# Patient Record
Sex: FEMALE | Race: WHITE | Employment: FULL TIME | URBAN - METROPOLITAN AREA
[De-identification: names, ages, dates, MRNs, and addresses within clinical notes are randomized per-mention and may not be internally consistent; named-entity substitution may affect disease eponyms.]

---

## 2017-06-21 ENCOUNTER — OFFICE VISIT (OUTPATIENT)
Dept: INTERNAL MEDICINE CLINIC | Age: 27
End: 2017-06-21

## 2017-06-21 VITALS
HEART RATE: 63 BPM | DIASTOLIC BLOOD PRESSURE: 70 MMHG | WEIGHT: 184.8 LBS | BODY MASS INDEX: 32.74 KG/M2 | HEIGHT: 63 IN | SYSTOLIC BLOOD PRESSURE: 110 MMHG | OXYGEN SATURATION: 99 %

## 2017-06-21 DIAGNOSIS — N76.0 ACUTE VAGINITIS: Primary | ICD-10-CM

## 2017-06-21 LAB — GLUCOSE BLD-MCNC: 71 MG/DL (ref 70–99)

## 2017-06-21 PROCEDURE — 99213 OFFICE O/P EST LOW 20 MIN: CPT | Performed by: INTERNAL MEDICINE

## 2017-06-21 PROCEDURE — 81025 URINE PREGNANCY TEST: CPT | Performed by: INTERNAL MEDICINE

## 2017-06-21 RX ORDER — FLUCONAZOLE 150 MG/1
TABLET ORAL
Qty: 2 TABLET | Refills: 0 | Status: SHIPPED | OUTPATIENT
Start: 2017-06-21 | End: 2017-10-13 | Stop reason: ALTCHOICE

## 2017-06-22 LAB — HIV-1 AND HIV-2 ANTIBODIES: NORMAL

## 2017-10-17 ENCOUNTER — OFFICE VISIT (OUTPATIENT)
Dept: INTERNAL MEDICINE CLINIC | Age: 27
End: 2017-10-17

## 2017-10-17 VITALS
WEIGHT: 196.6 LBS | OXYGEN SATURATION: 99 % | BODY MASS INDEX: 34.84 KG/M2 | SYSTOLIC BLOOD PRESSURE: 100 MMHG | HEIGHT: 63 IN | HEART RATE: 66 BPM | DIASTOLIC BLOOD PRESSURE: 70 MMHG

## 2017-10-17 DIAGNOSIS — F41.9 ANXIETY: Primary | ICD-10-CM

## 2017-10-17 DIAGNOSIS — R07.89 CHEST TIGHTNESS: ICD-10-CM

## 2017-10-17 PROCEDURE — 90686 IIV4 VACC NO PRSV 0.5 ML IM: CPT | Performed by: NURSE PRACTITIONER

## 2017-10-17 PROCEDURE — 99214 OFFICE O/P EST MOD 30 MIN: CPT | Performed by: NURSE PRACTITIONER

## 2017-10-17 PROCEDURE — 90471 IMMUNIZATION ADMIN: CPT | Performed by: NURSE PRACTITIONER

## 2017-10-17 RX ORDER — ESCITALOPRAM OXALATE 10 MG/1
20 TABLET ORAL DAILY
Qty: 60 TABLET | Refills: 3 | Status: SHIPPED | OUTPATIENT
Start: 2017-10-17 | End: 2022-10-24

## 2017-10-17 ASSESSMENT — ENCOUNTER SYMPTOMS
NAUSEA: 0
DIARRHEA: 0
CHEST TIGHTNESS: 1
VOMITING: 0
BLOOD IN STOOL: 0
COUGH: 0
CONSTIPATION: 0
SHORTNESS OF BREATH: 1

## 2017-10-17 ASSESSMENT — PATIENT HEALTH QUESTIONNAIRE - PHQ9
1. LITTLE INTEREST OR PLEASURE IN DOING THINGS: 0
SUM OF ALL RESPONSES TO PHQ9 QUESTIONS 1 & 2: 0
2. FEELING DOWN, DEPRESSED OR HOPELESS: 0
SUM OF ALL RESPONSES TO PHQ QUESTIONS 1-9: 0

## 2017-10-17 NOTE — PROGRESS NOTES
Subjective:      Patient ID: Ghazala Parnell is a 24 y.o. female. HPI Pt is here for ER follow up. PT has noticed she has been having anxiety for a while but has not tired medication. She feels like the heaviness is relate to her anxiety. Pt said that the ibuprofen does help the heaviness a little as well. PT has been on  mutiple medications for add and did not like the way that they made her feel such as straterra, adderall and concerta. Review of Systems   Constitutional: Negative for chills, fatigue and fever. Respiratory: Positive for chest tightness and shortness of breath. Negative for cough. Cardiovascular: Positive for palpitations (drinks a lot of coffee). Negative for chest pain and leg swelling. Gastrointestinal: Negative for blood in stool, constipation, diarrhea, nausea and vomiting. All other systems reviewed and are negative. Objective:   Physical Exam   Constitutional: She is oriented to person, place, and time. She appears well-developed and well-nourished. HENT:   Head: Normocephalic and atraumatic. Right Ear: Tympanic membrane, external ear and ear canal normal.   Left Ear: Tympanic membrane, external ear and ear canal normal.   Nose: Nose normal.   Mouth/Throat: Uvula is midline and oropharynx is clear and moist. No oropharyngeal exudate. Cardiovascular: Normal rate, regular rhythm and normal heart sounds. No murmur heard. Pulmonary/Chest: Effort normal and breath sounds normal.   Neurological: She is alert and oriented to person, place, and time. Skin: Skin is warm and dry. Psychiatric: She has a normal mood and affect. Her behavior is normal. Judgment and thought content normal.   Vitals reviewed. Assessment:      1. Anxiety  escitalopram (LEXAPRO) 10 MG tablet   2. Chest tightness             Plan:      Lorrie Miller was seen today for ed follow-up.     Diagnoses and all orders for this visit:    Anxiety - patient will start taking one tablet of Lexapro

## 2017-10-17 NOTE — PATIENT INSTRUCTIONS
escitalopram  Pronunciation:  EE sye PAN o pram  Brand:  Lexapro  What is the most important information I should know about escitalopram?  You should not use this medicine if you also take pimozide, or if you are being treated with methylene blue injection. Do not use escitalopram if you have taken an MAO inhibitor in the past 14 days. A dangerous drug interaction could occur. MAO inhibitors include isocarboxazid, linezolid, phenelzine, rasagiline, selegiline, and tranylcypromine. Some young people have thoughts about suicide when first taking an antidepressant. Your doctor will need to check your progress at regular visits while you are using vilazodone. Your family or other caregivers should also be alert to changes in your mood or symptoms. Do not give this medicine to anyone under 12 years. What is escitalopram?  Escitalopram is an antidepressant in a group of drugs called selective serotonin reuptake inhibitors (SSRIs). Escitalopram affects chemicals in the brain that may be unbalanced in people with depression or anxiety. Escitalopram is used to treat anxiety in adults. Escitalopram is also used to treat major depressive disorder in adults and adolescents who are at least 15years old. Escitalopram may also be used for purposes not listed in this medication guide. What should I discuss with my healthcare provider before taking escitalopram?  It is dangerous to try and purchase escitalopram on the Internet or from vendors outside of the [place]. Medications distributed from [employer] may contain dangerous ingredients, or may not be distributed by a licensed pharmacy. Samples of escitalopram purchased on the Internet have been found to contain haloperidol (Haldol), a potent antipsychotic drug with dangerous side effects. For more information, contact the U.S. Food and Drug Administration (FDA) or visit www.fda.gov/buyonlineguide.   You should not use this medicine if you are allergic to escitalopram or citalopram (Celexa), or if:  · you also take pimozide; or  · you are being treated with methylene blue injection. Do not use escitalopram if you have taken an MAO inhibitor in the past 14 days. A dangerous drug interaction could occur. MAO inhibitors include isocarboxazid, linezolid, phenelzine, rasagiline, selegiline, and tranylcypromine. After you stop taking escitalopram, you must wait at least 14 days before you start taking an MAOI. To make sure escitalopram is safe for you, tell your doctor if you have:  · liver or kidney disease;  · seizures or epilepsy;  · diabetes;  · narrow-angle glaucoma;  · heart disease;  · bipolar disorder (manic depression); or  · a history of drug abuse or suicidal thoughts. Some young people have thoughts about suicide when first taking an antidepressant. Your doctor will need to check your progress at regular visits while you are using escitalopram. Your family or other caregivers should also be alert to changes in your mood or symptoms. Taking an SSRI antidepressant during pregnancy may cause serious lung problems or other complications in the baby. However, you may have a relapse of depression if you stop taking your antidepressant. Tell your doctor right away if you become pregnant while taking escitalopram. Do not start or stop taking this medicine during pregnancy without your doctor's advice. Escitalopram can pass into breast milk and may harm a nursing baby. Tell your doctor if you are breast-feeding a baby. Escitalopram should not be given to a child younger than 15years old. How should I take escitalopram?  Follow all directions on your prescription label. Your doctor may occasionally change your dose to make sure you get the best results. Do not take this medicine in larger or smaller amounts or for longer than recommended. Try to take the medicine at the same time each day. Follow the directions on your prescription label.   Measure liquid medicine with the dosing syringe provided, or with a special dose-measuring spoon or medicine cup. If you do not have a dose-measuring device, ask your pharmacist for one. It may take up to 4 weeks or longer before your symptoms improve. Keep using the medication as directed and tell your doctor if your symptoms do not improve. Do not stop using escitalopram suddenly, or you could have unpleasant withdrawal symptoms. Follow your doctor's instructions about tapering your dose. Store at room temperature away from moisture and heat. What happens if I miss a dose? Take the missed dose as soon as you remember. Skip the missed dose if it is almost time for your next scheduled dose. Do not take extra medicine to make up the missed dose. What happens if I overdose? Seek emergency medical attention or call the Poison Help line at 1-692.802.3100. What should I avoid while taking escitalopram?  Avoid taking tryptophan while you are taking escitalopram.  Ask your doctor before taking a nonsteroidal anti-inflammatory drug (NSAID) for pain, arthritis, fever, or swelling. This includes aspirin, ibuprofen (Advil, Motrin), naproxen (Aleve), celecoxib (Celebrex), diclofenac, indomethacin, meloxicam, and others. Using an NSAID with escitalopram may cause you to bruise or bleed easily. Drinking alcohol can increase certain side effects of escitalopram.  Escitalopram may impair your thinking or reactions. Be careful if you drive or do anything that requires you to be alert. What are the possible side effects of escitalopram?  Get emergency medical help if you have signs of an allergic reaction: skin rash or hives; difficulty breathing; swelling of your face, lips, tongue, or throat.   Report any new or worsening symptoms to your doctor, such as: mood or behavior changes, anxiety, panic attacks, trouble sleeping, or if you feel impulsive, irritable, agitated, hostile, aggressive, restless, hyperactive (mentally or physically), or  · narcotic pain medication --fentanyl or tramadol. This list is not complete. Other drugs may interact with escitalopram, including prescription and over-the-counter medicines, vitamins, and herbal products. Not all possible interactions are listed in this medication guide. Where can I get more information? Your pharmacist can provide more information about escitalopram.    Remember, keep this and all other medicines out of the reach of children, never share your medicines with others, and use this medication only for the indication prescribed. Every effort has been made to ensure that the information provided by [facility] [address] is accurate, up-to-date, and complete, but no guarantee is made to that effect. Drug information contained herein may be time sensitive. [facility] information has been compiled for use by healthcare practitioners and consumers in the [place] and therefore [facility] does not warrant that uses outside of the [place] are appropriate, unless specifically indicated otherwise. [facility]'s drug information does not endorse drugs, diagnose patients or recommend therapy. [facility] drug information is an informational resource designed to assist licensed healthcare practitioners in caring for their patients and/or to serve consumers viewing this service as a supplement to, and not a substitute for, the expertise, skill, knowledge and judgment of healthcare practitioners. The absence of a warning for a given drug or drug combination in no way should be construed to indicate that the drug or drug combination is safe, effective or appropriate for any given patient. [facility] does not assume any responsibility for any aspect of healthcare administered with the aid of information [facility] provides. The information contained herein is not intended to cover all possible uses, directions, precautions, warnings, drug interactions, allergic reactions, or adverse effects.  If you have

## 2018-08-23 ENCOUNTER — TELEPHONE (OUTPATIENT)
Dept: INTERNAL MEDICINE CLINIC | Age: 28
End: 2018-08-23

## 2018-08-23 NOTE — TELEPHONE ENCOUNTER
Pt asking if Dr Karuna Palma can see her tomorrow to discuss getting back on her adderall, she is scheduled 8/29 but is asking if Dr Karuna Palma can see her tomorrow?       XJ#[phone]

## 2018-08-24 ENCOUNTER — OFFICE VISIT (OUTPATIENT)
Dept: INTERNAL MEDICINE CLINIC | Age: 28
End: 2018-08-24

## 2018-08-24 VITALS
HEART RATE: 55 BPM | BODY MASS INDEX: 37.88 KG/M2 | SYSTOLIC BLOOD PRESSURE: 114 MMHG | DIASTOLIC BLOOD PRESSURE: 66 MMHG | TEMPERATURE: 98 F | OXYGEN SATURATION: 99 % | WEIGHT: 213.8 LBS | HEIGHT: 63 IN

## 2018-08-24 DIAGNOSIS — F90.2 ATTENTION DEFICIT HYPERACTIVITY DISORDER (ADHD), COMBINED TYPE: Primary | ICD-10-CM

## 2018-08-24 DIAGNOSIS — L30.9 ECZEMA, UNSPECIFIED TYPE: ICD-10-CM

## 2018-08-24 DIAGNOSIS — E66.09 CLASS 2 OBESITY DUE TO EXCESS CALORIES WITHOUT SERIOUS COMORBIDITY WITH BODY MASS INDEX (BMI) OF 36.0 TO 36.9 IN ADULT: ICD-10-CM

## 2018-08-24 PROCEDURE — 99214 OFFICE O/P EST MOD 30 MIN: CPT | Performed by: INTERNAL MEDICINE

## 2018-08-24 RX ORDER — TRIAMCINOLONE ACETONIDE 5 MG/G
CREAM TOPICAL
Qty: 1 TUBE | Refills: 2 | Status: SHIPPED | OUTPATIENT
Start: 2018-08-24 | End: 2022-10-24

## 2018-08-24 RX ORDER — ATOMOXETINE 18 MG/1
18 CAPSULE ORAL DAILY
Qty: 30 CAPSULE | Refills: 3 | Status: SHIPPED | OUTPATIENT
Start: 2018-08-24 | End: 2022-10-24

## 2021-10-05 ENCOUNTER — HOSPITAL ENCOUNTER (EMERGENCY)
Age: 31
Discharge: HOME | End: 2021-10-05
Attending: EMERGENCY MEDICINE
Payer: COMMERCIAL

## 2021-10-05 ENCOUNTER — APPOINTMENT (OUTPATIENT)
Dept: GENERAL RADIOLOGY | Age: 31
End: 2021-10-05
Payer: COMMERCIAL

## 2021-10-05 VITALS
HEIGHT: 63 IN | BODY MASS INDEX: 44.3 KG/M2 | OXYGEN SATURATION: 99 % | DIASTOLIC BLOOD PRESSURE: 61 MMHG | HEART RATE: 72 BPM | SYSTOLIC BLOOD PRESSURE: 119 MMHG | RESPIRATION RATE: 18 BRPM | WEIGHT: 250 LBS | TEMPERATURE: 98.2 F

## 2021-10-05 DIAGNOSIS — Z20.822 SUSPECTED COVID-19 VIRUS INFECTION: ICD-10-CM

## 2021-10-05 DIAGNOSIS — J06.9 UPPER RESPIRATORY TRACT INFECTION, UNSPECIFIED TYPE: Primary | ICD-10-CM

## 2021-10-05 LAB
RAPID INFLUENZA  B AGN: NEGATIVE
RAPID INFLUENZA A AGN: NEGATIVE

## 2021-10-05 PROCEDURE — 71045 X-RAY EXAM CHEST 1 VIEW: CPT

## 2021-10-05 PROCEDURE — 6370000000 HC RX 637 (ALT 250 FOR IP): Performed by: PHYSICIAN ASSISTANT

## 2021-10-05 PROCEDURE — U0005 INFEC AGEN DETEC AMPLI PROBE: HCPCS

## 2021-10-05 PROCEDURE — 99284 EMERGENCY DEPT VISIT MOD MDM: CPT

## 2021-10-05 PROCEDURE — 87804 INFLUENZA ASSAY W/OPTIC: CPT

## 2021-10-05 PROCEDURE — U0003 INFECTIOUS AGENT DETECTION BY NUCLEIC ACID (DNA OR RNA); SEVERE ACUTE RESPIRATORY SYNDROME CORONAVIRUS 2 (SARS-COV-2) (CORONAVIRUS DISEASE [COVID-19]), AMPLIFIED PROBE TECHNIQUE, MAKING USE OF HIGH THROUGHPUT TECHNOLOGIES AS DESCRIBED BY CMS-2020-01-R: HCPCS

## 2021-10-05 RX ORDER — BENZONATATE 100 MG/1
100 CAPSULE ORAL 3 TIMES DAILY PRN
Qty: 15 CAPSULE | Refills: 0 | Status: SHIPPED | OUTPATIENT
Start: 2021-10-05 | End: 2022-10-24

## 2021-10-05 RX ORDER — ACETAMINOPHEN 325 MG/1
650 TABLET ORAL ONCE
Status: COMPLETED | OUTPATIENT
Start: 2021-10-05 | End: 2021-10-05

## 2021-10-05 RX ADMIN — ACETAMINOPHEN 650 MG: 325 TABLET ORAL at 12:00

## 2021-10-05 ASSESSMENT — PAIN DESCRIPTION - PAIN TYPE
TYPE: ACUTE PAIN
TYPE: ACUTE PAIN

## 2021-10-05 ASSESSMENT — ENCOUNTER SYMPTOMS
VOMITING: 0
COUGH: 1
EYE REDNESS: 0
ABDOMINAL PAIN: 0
TROUBLE SWALLOWING: 0
DIARRHEA: 0
NAUSEA: 0
SHORTNESS OF BREATH: 1
SORE THROAT: 1
VOICE CHANGE: 0

## 2021-10-05 ASSESSMENT — PAIN SCALES - GENERAL
PAINLEVEL_OUTOF10: 6
PAINLEVEL_OUTOF10: 7
PAINLEVEL_OUTOF10: 0

## 2021-10-05 ASSESSMENT — PAIN DESCRIPTION - LOCATION: LOCATION: THROAT

## 2021-10-05 NOTE — ED PROVIDER NOTES
[address]          PHYSICIAN ASSISTANT NOTE       Date of evaluation: 10/5/2021    Chief Complaint     Pharyngitis, Cough, Shortness of Breath, and Fever      History of Present Illness     Robert Rincon is a 36 y.o. female who presents emergency department with complaints of sore throat, cough, fever. Patient states that her symptoms began on Sunday, 2 days ago, with a sore throat. She states that she went to urgent care where she tested negative for COVID-19 and strep. However, she states that she was treated for presumed strep with an IM injection of steroids and prescriptions for Cefdinir and 20mg prednisone BID. She has taken 2 days worth of these medications and reports she does not feel better. She reports that she has had a persistent temp of 102F until today. She has not taken tylenol or ibuprofen. She additionally reports a productive cough of green sputum speckled with blood, body aches, and diarrhea. She also feels that her breath is short. She denies ear pain, difficulty swallowing, abdominal pain, nausea or vomiting, difficulty urinating, hematochezia or melena. She has been vaccinated for COVID-19 with 2 doses of Pfizer in March 2021. She reports her coworker's daughter was recently sick with similar symptoms. Review of Systems     Review of Systems   Constitutional: Positive for chills, fatigue and fever. HENT: Positive for sore throat. Negative for trouble swallowing and voice change. Eyes: Negative for redness. Respiratory: Positive for cough and shortness of breath. Cardiovascular: Positive for chest pain (with coughing). Negative for leg swelling. Gastrointestinal: Negative for abdominal pain, diarrhea, nausea and vomiting. Genitourinary: Negative for difficulty urinating. Musculoskeletal: Positive for myalgias. Skin: Negative for wound. Neurological: Negative for headaches.    Psychiatric/Behavioral: The patient is not nervous/anxious. Past Medical, Surgical, Family, and Social History     She has a past medical history of ADHD (attention deficit hyperactivity disorder), Bacterial vaginosis, and CAP (community acquired pneumonia). She has a past surgical history that includes Tonsillectomy (2011). Her family history includes Crohn's Disease in her mother. She reports that she quit smoking about 13 years ago. Her smoking use included cigarettes. She has a 0.20 pack-year smoking history. She has never used smokeless tobacco. She reports current alcohol use of about 2.0 - 3.0 standard drinks of alcohol per week. She reports that she does not use drugs. Medications     Previous Medications    ATOMOXETINE (STRATTERA) 18 MG CAPSULE    Take 1 capsule by mouth daily    ESCITALOPRAM (LEXAPRO) 10 MG TABLET    Take 2 tablets by mouth daily    TRIAMCINOLONE (ARISTOCORT) 0.5 % CREAM    Apply topically 3 times daily. Allergies     She is allergic to hydrocodone-acetaminophen. Physical Exam     INITIAL VITALS: BP: 119/61,Temp: 98.6 °F (37 °C), Pulse: 72, Resp: 16, SpO2: 99 %   Physical Exam  Vitals and nursing note reviewed. Constitutional:       General: She is not in acute distress. HENT:      Head: Normocephalic and atraumatic. Mouth/Throat:      Mouth: Mucous membranes are moist.      Pharynx: No oropharyngeal exudate or posterior oropharyngeal erythema. Eyes:      Extraocular Movements: Extraocular movements intact. Conjunctiva/sclera: Conjunctivae normal.   Cardiovascular:      Rate and Rhythm: Normal rate and regular rhythm. Pulmonary:      Effort: Pulmonary effort is normal. No respiratory distress. Breath sounds: Normal breath sounds. No wheezing, rhonchi or rales. Abdominal:      General: Bowel sounds are normal. There is no distension. Palpations: Abdomen is soft. Tenderness: There is no abdominal tenderness. There is no guarding or rebound.    Musculoskeletal:         General: No deformity. Cervical back: Normal range of motion and neck supple. Lymphadenopathy:      Cervical: No cervical adenopathy. Skin:     General: Skin is warm and dry. Neurological:      Mental Status: She is alert and oriented to person, place, and time. Psychiatric:         Mood and Affect: Mood normal.         Behavior: Behavior normal.         Diagnostic Results     RADIOLOGY:  XR CHEST PORTABLE   Final Result   Impression:      No acute cardiopulmonary abnormality. LABS:   Results for orders placed or performed during the hospital encounter of 10/05/21   Rapid influenza A/B antigens    Specimen: Nasopharyngeal   Result Value Ref Range    Rapid Influenza A Ag Negative Negative    Rapid Influenza B Ag Negative Negative       RECENT VITALS:  BP: 119/61, Temp: 98.6 °F (37 °C), Pulse: 72,Resp: 16, SpO2: 99 %     Procedures         ED Course     Nursing Notes, Past Medical Hx, Past Surgical Hx, Social Hx, Allergies, and Family Hx were reviewed. The patient was given the followingmedications:  Orders Placed This Encounter   Medications    acetaminophen (TYLENOL) tablet 650 mg    benzonatate (TESSALON PERLES) 100 MG capsule     Sig: Take 1 capsule by mouth 3 times daily as needed for Cough     Dispense:  15 capsule     Refill:  0       CONSULTS:  None    MEDICAL DECISION MAKING / ASSESSMENT / Mercedes Ludy is a 36 y.o. female who presents emergency department with 2 days of sore throat, cough, fever, body aches. Patient had negative rapid COVID-19 and strep test at Urgent Care 2 days ago. She was prescribed cefdinir and prednisone but does not feel better. On arrival, patient is afebrile with normal vital signs. No acute respiratory distress. Posterior pharynx is non-erythematous. Uvula is midline. No tonsillar enlargement. Patient tolerating secretions without difficulty. No cervical lymphadenopathy. Lungs clear. Heart rhythm regular. Abdomen soft and non-tender.  No peripheral edema or calf tenderness. Patient's presentation is most consistent with viral URI. Despite negative rapid covid, my suspicion for COVID-19 infection remains high. COVID-19 PCR swab sent. Patient's posterior pharynx appears benign; however, she has been on abx for 2 days - will have her finish out prescription. Influenza negative. CXR negative. Patient given tylenol for her body aches. Will plan to prescribe tessalon pearls for symptomatic relief of cough. Patient is stable for discharge home at this time with PCP follow up and strict return precautions. Prior to discharge, patient was ambulatory and PO tolerant. This patient was also evaluated by the attending physician. All care plans were discussed and agreed upon. Clinical Impression     1. Upper respiratory tract infection, unspecified type    2. Suspected COVID-19 virus infection        Disposition     PATIENT REFERRED TO:  [facility] Emergency Department  [address] [address]  [phone]          DISCHARGE MEDICATIONS:  New Prescriptions    BENZONATATE (TESSALON PERLES) 100 MG CAPSULE    Take 1 capsule by mouth 3 times daily as needed for Cough       DISPOSITION  Discharge.        Erasmo Gonzalez PA-C  10/05/21 [id number]

## 2021-10-05 NOTE — ED PROVIDER NOTES
ED Attending Attestation Note     Date of evaluation: 10/5/2021    This patient was seen by the advance practice provider. I have seen and examined the patient, agree with the workup, evaluation, management and diagnosis. The care plan has been discussed. My assessment reveals a 79-year-old female presenting to the emerge department with generally feeling unwell shortness of breath cough sore throat. On assessment patient sitting in bed with no significant cardiorespiratory stress abdomen soft nontender without rebound or guarding.      Natalie Rodriguez MD  10/05/21 1113

## 2021-10-06 ENCOUNTER — CARE COORDINATION (OUTPATIENT)
Dept: CARE COORDINATION | Age: 31
End: 2021-10-06

## 2021-10-06 LAB — SARS-COV-2: NOT DETECTED

## 2022-03-31 ENCOUNTER — OFFICE VISIT (OUTPATIENT)
Dept: FAMILY MEDICINE CLINIC | Age: 32
End: 2022-03-31
Payer: COMMERCIAL

## 2022-03-31 VITALS
HEIGHT: 63 IN | HEART RATE: 87 BPM | OXYGEN SATURATION: 99 % | WEIGHT: 250 LBS | BODY MASS INDEX: 44.3 KG/M2 | SYSTOLIC BLOOD PRESSURE: 110 MMHG | DIASTOLIC BLOOD PRESSURE: 78 MMHG | TEMPERATURE: 97.1 F

## 2022-03-31 DIAGNOSIS — F33.2 SEVERE EPISODE OF RECURRENT MAJOR DEPRESSIVE DISORDER, WITHOUT PSYCHOTIC FEATURES (HCC): ICD-10-CM

## 2022-03-31 PROCEDURE — 4004F PT TOBACCO SCREEN RCVD TLK: CPT | Performed by: NURSE PRACTITIONER

## 2022-03-31 PROCEDURE — G8417 CALC BMI ABV UP PARAM F/U: HCPCS | Performed by: NURSE PRACTITIONER

## 2022-03-31 PROCEDURE — G8484 FLU IMMUNIZE NO ADMIN: HCPCS | Performed by: NURSE PRACTITIONER

## 2022-03-31 PROCEDURE — 99204 OFFICE O/P NEW MOD 45 MIN: CPT | Performed by: NURSE PRACTITIONER

## 2022-03-31 PROCEDURE — G8427 DOCREV CUR MEDS BY ELIG CLIN: HCPCS | Performed by: NURSE PRACTITIONER

## 2022-03-31 SDOH — HEALTH STABILITY: PHYSICAL HEALTH: ON AVERAGE, HOW MANY DAYS PER WEEK DO YOU ENGAGE IN MODERATE TO STRENUOUS EXERCISE (LIKE A BRISK WALK)?: 3 DAYS

## 2022-03-31 SDOH — ECONOMIC STABILITY: FOOD INSECURITY: WITHIN THE PAST 12 MONTHS, YOU WORRIED THAT YOUR FOOD WOULD RUN OUT BEFORE YOU GOT MONEY TO BUY MORE.: NEVER TRUE

## 2022-03-31 SDOH — HEALTH STABILITY: PHYSICAL HEALTH: ON AVERAGE, HOW MANY MINUTES DO YOU ENGAGE IN EXERCISE AT THIS LEVEL?: 30 MIN

## 2022-03-31 SDOH — ECONOMIC STABILITY: FOOD INSECURITY: WITHIN THE PAST 12 MONTHS, THE FOOD YOU BOUGHT JUST DIDN'T LAST AND YOU DIDN'T HAVE MONEY TO GET MORE.: NEVER TRUE

## 2022-03-31 ASSESSMENT — ENCOUNTER SYMPTOMS
CHEST TIGHTNESS: 0
SORE THROAT: 0
COLOR CHANGE: 0
RHINORRHEA: 0
ABDOMINAL PAIN: 0
VOMITING: 0
BLOOD IN STOOL: 0
COUGH: 0
DIARRHEA: 0
NAUSEA: 0
EYE REDNESS: 0
SINUS PRESSURE: 0
SHORTNESS OF BREATH: 0
EYE ITCHING: 0
WHEEZING: 0
CONSTIPATION: 0
BACK PAIN: 0

## 2022-03-31 ASSESSMENT — PATIENT HEALTH QUESTIONNAIRE - PHQ9
8. MOVING OR SPEAKING SO SLOWLY THAT OTHER PEOPLE COULD HAVE NOTICED. OR THE OPPOSITE, BEING SO FIGETY OR RESTLESS THAT YOU HAVE BEEN MOVING AROUND A LOT MORE THAN USUAL: 3
9. THOUGHTS THAT YOU WOULD BE BETTER OFF DEAD, OR OF HURTING YOURSELF: 3
7. TROUBLE CONCENTRATING ON THINGS, SUCH AS READING THE NEWSPAPER OR WATCHING TELEVISION: 3
SUM OF ALL RESPONSES TO PHQ QUESTIONS 1-9: 25
SUM OF ALL RESPONSES TO PHQ9 QUESTIONS 1 & 2: 4
5. POOR APPETITE OR OVEREATING: 3
SUM OF ALL RESPONSES TO PHQ QUESTIONS 1-9: 25
1. LITTLE INTEREST OR PLEASURE IN DOING THINGS: 3
SUM OF ALL RESPONSES TO PHQ QUESTIONS 1-9: 25
2. FEELING DOWN, DEPRESSED OR HOPELESS: 1
4. FEELING TIRED OR HAVING LITTLE ENERGY: 3
10. IF YOU CHECKED OFF ANY PROBLEMS, HOW DIFFICULT HAVE THESE PROBLEMS MADE IT FOR YOU TO DO YOUR WORK, TAKE CARE OF THINGS AT HOME, OR GET ALONG WITH OTHER PEOPLE: 1
SUM OF ALL RESPONSES TO PHQ QUESTIONS 1-9: 22
3. TROUBLE FALLING OR STAYING ASLEEP: 3
6. FEELING BAD ABOUT YOURSELF - OR THAT YOU ARE A FAILURE OR HAVE LET YOURSELF OR YOUR FAMILY DOWN: 3

## 2022-03-31 ASSESSMENT — COLUMBIA-SUICIDE SEVERITY RATING SCALE - C-SSRS
6. HAVE YOU EVER DONE ANYTHING, STARTED TO DO ANYTHING, OR PREPARED TO DO ANYTHING TO END YOUR LIFE?: YES
1. WITHIN THE PAST MONTH, HAVE YOU WISHED YOU WERE DEAD OR WISHED YOU COULD GO TO SLEEP AND NOT WAKE UP?: YES
2. HAVE YOU ACTUALLY HAD ANY THOUGHTS OF KILLING YOURSELF?: YES
4. HAVE YOU HAD THESE THOUGHTS AND HAD SOME INTENTION OF ACTING ON THEM?: YES
7. DID THIS OCCUR IN THE LAST THREE MONTHS: YES
5. HAVE YOU STARTED TO WORK OUT OR WORKED OUT THE DETAILS OF HOW TO KILL YOURSELF? DO YOU INTEND TO CARRY OUT THIS PLAN?: YES
3. HAVE YOU BEEN THINKING ABOUT HOW YOU MIGHT KILL YOURSELF?: YES

## 2022-03-31 ASSESSMENT — SOCIAL DETERMINANTS OF HEALTH (SDOH)
WITHIN THE LAST YEAR, HAVE TO BEEN RAPED OR FORCED TO HAVE ANY KIND OF SEXUAL ACTIVITY BY YOUR PARTNER OR EX-PARTNER?: NO
WITHIN THE LAST YEAR, HAVE YOU BEEN AFRAID OF YOUR PARTNER OR EX-PARTNER?: NO
WITHIN THE LAST YEAR, HAVE YOU BEEN KICKED, HIT, SLAPPED, OR OTHERWISE PHYSICALLY HURT BY YOUR PARTNER OR EX-PARTNER?: NO
WITHIN THE LAST YEAR, HAVE YOU BEEN HUMILIATED OR EMOTIONALLY ABUSED IN OTHER WAYS BY YOUR PARTNER OR EX-PARTNER?: NO
HOW HARD IS IT FOR YOU TO PAY FOR THE VERY BASICS LIKE FOOD, HOUSING, MEDICAL CARE, AND HEATING?: NOT HARD AT ALL

## 2022-03-31 NOTE — ASSESSMENT & PLAN NOTE
PHQ-9 score today: (PHQ-9 Total Score: 25), additional evaluation and assessment performed, follow-up plan includes but not limited to:Referral to /Specialist  for evaluation and management. At this time I do not believe that she has a risk to self-harm or suicide. She has a boyfriend in support system who is aware of what she is going through. She did make an appointment today with Dr. Yin Vazquez and will be following up with her. I have mentioned Elvis Treviño our psychiatry nurse practitioner as well and she is interested in meeting with Dr. Yin Vazquez first before adding in medication. Advised patient that if she has any thoughts of self-harm or suicide or plans to act upon them to call 911 right away yet and to reach out to support. She verbalized understanding. At this time since we are not starting a medication we will have her follow-up with Dr. Yin Vazquez admit I will follow-up with her a couple weeks thereafter to see how she is doing. Patient to arrange appointment when that is set up.

## 2022-03-31 NOTE — PROGRESS NOTES
Subjective:     CC:  New Patient (mental health concerns)      HPI:  Emma Dejesus is here as a new patient to establish care and discuss her overall mental health. She states that she was seeing a primary care down in [place] with Sarah Horta. She reports that her medical history for her partially is rather benign however her mental health has always been a concern. She was diagnosed with ADHD in her childhood and has been medicated for this in the past but currently is not taking any medications. She states the big concern for her today is in regards to her depression and passing thoughts of suicide. She states that she made an attempt when she was in eighth grade when she drank a bottle of NyQuil. She states that she grew up with her brother who was an addict who now is sober but as a child she wondered day-to-day if he would be alive or not and this greatly affected her. She states that she tried to tell her parents that she was affected however they never really seem to take her serious or give her the support that she needed. This led her to her decision eighth-grade. She states that she told her friend what she had done and the friend informed the teacher. She states that her parents told her to sleep it off for a couple of days and she will be tired but no further treatment or support was provided. She states that she has never seen a psychiatrist or psychologist in the past.  She states that taking medication makes her nervous and not a treatment path she is interested in at this time. She states she is very interested in talking to someone. She states at this time she does not have any plans or intention to harm herself or commit suicide. She states that she sometimes she has passing thoughts that life would be easier if she was not here. She states that she has a boyfriend and is close to her father and friends.   Of note her mother passed away in 2018 from aware cerebellar condition. Vitals:    22 0840   BP: 110/78   Pulse: 87   Temp: 97.1 °F (36.2 °C)   SpO2: 99%       Wt Readings from Last 3 Encounters:   22 250 lb (113.4 kg)   10/05/21 250 lb (113.4 kg)   18 213 lb 12.8 oz (97 kg)       Past Medical History:   Diagnosis Date    ADHD (attention deficit hyperactivity disorder)     3rd grade    Bacterial vaginosis     CAP (community acquired pneumonia) 2015    RLL       Past Surgical History:   Procedure Laterality Date    TONSILLECTOMY         Family History   Problem Relation Age of Onset    Crohn's Disease Mother     Heart Attack Father     Depression Father     Substance Abuse Brother        Social History     Tobacco Use    Smoking status: Former Smoker     Packs/day: 0.20     Years: 1.00     Pack years: 0.20     Types: Cigarettes     Quit date: 2008     Years since quittin.2    Smokeless tobacco: Never Used   Substance Use Topics    Alcohol use:  Yes     Alcohol/week: 2.0 - 3.0 standard drinks     Types: 2 - 3 Cans of beer per week    Drug use: No       Immunization History   Administered Date(s) Administered    COVID-19, Pfizer Purple top, DILUTE for use, 12+ yrs, 30mcg/0.3mL dose 2021, 2021, 12/15/2021    Influenza, Quadv, IM, PF (6 mo and older Fluzone, Flulaval, Fluarix, and 3 yrs and older Afluria) 2016, 10/17/2017    Pneumococcal Polysaccharide ([id number]) 2015       Health Maintenance   Topic Date Due    Hepatitis C screen  Never done    Varicella vaccine (1 of 2 - 2-dose childhood series) Never done    Depression Monitoring  Never done    Cervical cancer screen  Never done    DTaP/Tdap/Td vaccine (7 - Td or Tdap) 10/26/2029    Hepatitis B vaccine  Completed    Hib vaccine  Completed    Flu vaccine  Completed    COVID-19 Vaccine  Completed    HIV screen  Completed    Hepatitis A vaccine  Aged Out    Meningococcal (ACWY) vaccine  Aged Out    Pneumococcal 0-64 years Vaccine  Aged Out       Review of Systems   Constitutional: Negative for chills, fatigue and fever. HENT: Negative for congestion, ear pain, postnasal drip, rhinorrhea, sinus pressure, sneezing and sore throat. Eyes: Negative for redness and itching. Respiratory: Negative for cough, chest tightness, shortness of breath and wheezing. Cardiovascular: Negative for chest pain and palpitations. Gastrointestinal: Negative for abdominal pain, blood in stool, constipation, diarrhea, nausea and vomiting. Endocrine: Negative for cold intolerance and heat intolerance. Genitourinary: Negative for difficulty urinating, dysuria, flank pain, frequency, hematuria and urgency. Musculoskeletal: Negative for arthralgias, back pain, joint swelling and myalgias. Skin: Negative for color change, pallor, rash and wound. Allergic/Immunologic: Negative for environmental allergies and food allergies. Neurological: Negative for dizziness, seizures, syncope, weakness, light-headedness, numbness and headaches. Hematological: Negative for adenopathy. Does not bruise/bleed easily. Psychiatric/Behavioral: Negative for confusion, sleep disturbance and suicidal ideas. The patient is not nervous/anxious and is not hyperactive. Per hpi         Objective:     Physical Exam  Constitutional:       Appearance: Normal appearance. She is well-developed. HENT:      Head: Normocephalic and atraumatic. Right Ear: Hearing normal.      Left Ear: Hearing normal.      Nose: No mucosal edema. Right Sinus: No maxillary sinus tenderness or frontal sinus tenderness. Left Sinus: No maxillary sinus tenderness or frontal sinus tenderness. Mouth/Throat: Tonsils: No tonsillar abscesses. Eyes:      Extraocular Movements: Extraocular movements intact. Pupils: Pupils are equal, round, and reactive to light. Cardiovascular:      Rate and Rhythm: Normal rate and regular rhythm. Pulses: Normal pulses.       Heart sounds: Normal heart sounds. Pulmonary:      Effort: Pulmonary effort is normal.      Breath sounds: Normal breath sounds. Lymphadenopathy:      Head:      Right side of head: No submental, submandibular, tonsillar, preauricular, posterior auricular or occipital adenopathy. Left side of head: No submental, submandibular, tonsillar, preauricular, posterior auricular or occipital adenopathy. Skin:     General: Skin is warm and dry. Neurological:      Mental Status: She is alert. Psychiatric:         Mood and Affect: Mood normal.         Behavior: Behavior normal.         Assessment:      See ProblemList assessment and plan       PHQ Scores 3/31/2022 10/17/2017   PHQ2 Score 4 0   PHQ9 Score 25 0     Interpretation of Total Score Depression Severity: 1-4 = Minimal depression, 5-9 = Milddepression, 10-14 = Moderate depression, 15-19 = Moderately severe depression, 20-27 = Severe depression    Plan:      Severe episode of recurrent major depressive disorder, without psychotic features ([facility] [address]   PHQ-9 score today: (PHQ-9 Total Score: 25), additional evaluation and assessment performed, follow-up plan includes but not limited to:Referral to /Specialist  for evaluation and management. At this time I do not believe that she has a risk to self-harm or suicide. She has a boyfriend in support system who is aware of what she is going through. She did make an appointment today with Dr. Virgilio Dahl and will be following up with her. I have mentioned [facility] our psychiatry nurse practitioner as well and she is interested in meeting with Dr. Virgilio Dahl first before adding in medication. Advised patient that if she has any thoughts of self-harm or suicide or plans to act upon them to call 911 right away yet and to reach out to support. She verbalized understanding.   At this time since we are not starting a medication we will have her follow-up with Dr. Virgilio Dahl admit I will follow-up with her a couple weeks thereafter to see how she is doing. Patient to arrange appointment when that is set up. Discussed over the counter medication with patient. Chata Rosas received counseling on the following healthybehaviors: nutrition, exercise, and medication adherence    Patient given educational materials on their chronic medical conditions    Discussed use, benefit, and side effects of prescribed medications. Barriersto medication compliance addressed. All patient questions answered. Patient voiced understanding. Medications reviewed and patient understands.   Questions answered

## 2022-04-11 ENCOUNTER — TELEMEDICINE (OUTPATIENT)
Dept: PSYCHOLOGY | Age: 32
End: 2022-04-11
Payer: COMMERCIAL

## 2022-04-11 DIAGNOSIS — F33.1 MAJOR DEPRESSIVE DISORDER, RECURRENT EPISODE, MODERATE (HCC): Primary | ICD-10-CM

## 2022-04-11 DIAGNOSIS — F90.2 ATTENTION DEFICIT HYPERACTIVITY DISORDER (ADHD), COMBINED TYPE: ICD-10-CM

## 2022-04-11 DIAGNOSIS — F41.9 ANXIETY: ICD-10-CM

## 2022-04-11 PROCEDURE — 90791 PSYCH DIAGNOSTIC EVALUATION: CPT | Performed by: PSYCHOLOGIST

## 2022-04-11 NOTE — PATIENT INSTRUCTIONS
Goals: 1. Consider journaling in writing or using an harpreet like Day One  2.  Consider listening to the Unlocking Us with Cain Forman podcast, the episode in which she interviews Annette Stoddard and Isabella Bianchi, co-authors of Burnout, to learn more about ways to complete the stress cycle (on Spotify or Cain Torres's website)

## 2022-04-11 NOTE — PROGRESS NOTES
Behavioral Health Consultation  Lilly Luna Psy.D. Psychologist  4/11/2022  2:30-3 PM EDT      Time spent with Patient: 30 minutes  This is patient's first [facility] appointment. Reason for Consult: Depression, anxiety  Referring Provider: JANEY Kitchen CNP  [address] [facility] [address] [facility],First Floor [address]    TELEHEALTH VISIT -- Audio/Visual (During [facility]- public health emergency)    Pt provided informed consent for the behavioral health program and participation in telehealth services. Discussed with patient the model of service, including the limits of confidentiality (e.g., abuse reporting, suicide intervention) and the nature of the [facility] approach (e.g., focused, targeted interventions; open communication with PCP). Pt indicated understanding. Feedback given to PCP. }  Reynaldo Mai was evaluated through a synchronous (real-time) audio-video encounter using HIPAA-compliant technology. The patient (or guardian, if applicable) is aware that this is a billable service, which includes applicable co-pays. This Virtual Visit was conducted with patient's (and/or legal guardian's) consent. The visit was conducted pursuant to the emergency declaration under the [address] [place] [address] authority and the [employer] General Act. Patient identification was verified, and a caregiver was present when appropriate. The patient was located in a state where the provider was licensed to provide care. Conducted a risk-benefit analysis and determined that the patient's presenting problems are consistent with the use of telepsychology. Determined that the patient has sufficient knowledge and skills in the use of technology enabling them to adequately benefit from telepsychology. It was determined that this patient was able to be properly treated without an in-person session.  Patient verified current location at the beginning of the visit. Verified the following information:  Patient's identification: Yes  Patient location: [address] [place] [address]  Patient's call back number: [phone]  Patient's emergency contact's name and number, as well as permission to contact them if needed:  Extended Emergency Contact Information  Primary Emergency Contact: Harshil Phone: [phone]  Relation: Domestic Partner    Provider location: Madeleine Arias:    [address] with boyfriend and dog. Settling into new home together.     -Family / Support System - Boyfriend. Brother. Pt grew up with limited resources. Brother was addicted to drugs until 5 years ago. Pt always struggled in school. Mother tragically  in 2019 of a rare disease called RAD.     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 Still struggles with school d/t ADHD, which she doesn't address fully.      -Fun / [address]. Hangs out with friends occasionally. Wants to do more but social anxiety gets in the way. -Stress Management - Nothing. Tries to forget about it.    -Orthodox / Spiritual Life - No      Health Behaviors     -Alcohol / Drugs - No    -Sleep - Poor. Hard to turn off her mind. Takes melatonin to help her fall asleep.     -Exercise - No. Trying to do more. Hates the gym, gives her anxiety.     -Nutrition / Eating History - Gotten better. Eats healthy for awhile. Binge eats. No compensatory strategies.     -Risk Assessment - History of SI, particularly after her mother . Thinks about how it would affect ppl in her life, and she doesn't want to hurt ppl. No plan or intent at any time. -Mental Health - History of depression and ADHD. Saw one therapist one time before. Hard to open up. Tendency to try medication briefly, effects don't last, and she doesn't follow through. Social History     Tobacco Use    Smoking status: Former Smoker     Packs/day: 0.20     Years: 1.00     Pack years: 0.20     Types: Cigarettes     Quit date: 2008     Years since quittin.2    Smokeless tobacco: Never Used   Substance Use Topics    Alcohol use: Yes     Alcohol/week: 2.0 - 3.0 standard drinks     Types: 2 - 3 Cans of beer per week      Illicit drugs:   Social History     Substance and Sexual Activity   Drug Use No        O:  Interventions:  -Contextual assessment  -Supportive techniques  -Conducted risk assessment. Appropriate for outpatient / telehealth care at this time. A:  MSE:  Appearance: good hygiene  and appropriate attire  Attitude: cooperative, friendly and mild distress  Consciousness: alert  Orientation: oriented to person, place, time, general circumstance  Memory: recent and remote memory intact  Attention/Concentration: intact during session  Psychomotor Activity: normal  Eye Contact: normal  Speech: normal rate and volume, well-articulated  Mood: dysphoric, anxious  Affect: congruent  Perception: within normal limits  Thought Content: within normal limits  Thought Process: logical, coherent and goal-directed  Insight: good  Judgment: intact  Ability to understand instructions: Yes  Ability to respond meaningfully: Yes  Morbid Ideation: no   Suicide Assessment: no suicidal ideation, plan, or intent. Appropriate for outpatient / telehealth care at this time. Homicidal Ideation: no      PHQ Scores 3/31/2022 10/17/2017   PHQ2 Score 4 0   PHQ9 Score 25 0     Interpretation of Total Score Depression Severity: 1-4 = Minimal depression, 5-9 = Mild depression, 10-14 = Moderate depression, 15-19 = Moderately severe depression, 20-27 = Severe depression    Diagnosis:    1. Major depressive disorder, recurrent episode, moderate ([address]    2. Anxiety    3.  Attention deficit hyperactivity disorder (ADHD), combined type        Patient Active Problem List   Diagnosis    Jenni    ADHD (attention deficit hyperactivity disorder)    Itching    URTI (acute upper respiratory infection)    Severe episode of recurrent major depressive disorder, without psychotic features ([address]         Plan:  Pt interventions:  Established rapport, [place]-setting to identify pt's primary goals for [facility] visit / overall health, Supportive techniques, Emphasized self-care as important for managing overall health, Cognitive strategies to target balanced thinking, Discussed psychotropic medications and Completed risk evaluation. Pt Behavioral Change Plan:  Pt set the following goals:  1. Consider journaling in writing or using an harpreet like Day One  2. Consider listening to the Unlocking Us with Sidney Larose Pears podcast, the episode in which she interviews Luis Aiken and Sharron Clark, co-authors of Burnout, to learn more about ways to complete the stress cycle (on Spotify or Sidney Torres's website)    Pt scheduled a F/U virtual visit.

## 2022-05-10 ENCOUNTER — TELEPHONE (OUTPATIENT)
Dept: FAMILY MEDICINE CLINIC | Age: 32
End: 2022-05-10

## 2022-05-10 NOTE — TELEPHONE ENCOUNTER
----- Message from Kyra CoburnUdayDaylin sent at 5/10/2022  1:24 PM EDT -----  Subject: Message to Provider    QUESTIONS  Information for Provider? patient called in to cancel appt with Lyndsey Jauregui she would like to be contacted to reschedule appt.   ---------------------------------------------------------------------------  --------------  CALL BACK INFO  What is the best way for the office to contact you? OK to leave message on   voicemail  Preferred Call Back Phone Number? [phone]  ---------------------------------------------------------------------------  --------------  SCRIPT ANSWERS  Relationship to Patient?  Self

## 2022-10-21 ENCOUNTER — TELEPHONE (OUTPATIENT)
Dept: FAMILY MEDICINE CLINIC | Age: 32
End: 2022-10-21

## 2022-10-24 ENCOUNTER — OFFICE VISIT (OUTPATIENT)
Dept: FAMILY MEDICINE CLINIC | Age: 32
End: 2022-10-24
Payer: COMMERCIAL

## 2022-10-24 VITALS
SYSTOLIC BLOOD PRESSURE: 128 MMHG | OXYGEN SATURATION: 99 % | WEIGHT: 257 LBS | BODY MASS INDEX: 45.53 KG/M2 | TEMPERATURE: 96.9 F | DIASTOLIC BLOOD PRESSURE: 80 MMHG | HEART RATE: 61 BPM

## 2022-10-24 DIAGNOSIS — F90.2 ATTENTION DEFICIT HYPERACTIVITY DISORDER (ADHD), COMBINED TYPE: Primary | ICD-10-CM

## 2022-10-24 LAB — PAP SMEAR, EXTERNAL: NORMAL

## 2022-10-24 PROCEDURE — G8484 FLU IMMUNIZE NO ADMIN: HCPCS | Performed by: NURSE PRACTITIONER

## 2022-10-24 PROCEDURE — G8427 DOCREV CUR MEDS BY ELIG CLIN: HCPCS | Performed by: NURSE PRACTITIONER

## 2022-10-24 PROCEDURE — 4004F PT TOBACCO SCREEN RCVD TLK: CPT | Performed by: NURSE PRACTITIONER

## 2022-10-24 PROCEDURE — 99213 OFFICE O/P EST LOW 20 MIN: CPT | Performed by: NURSE PRACTITIONER

## 2022-10-24 PROCEDURE — G8417 CALC BMI ABV UP PARAM F/U: HCPCS | Performed by: NURSE PRACTITIONER

## 2022-10-24 RX ORDER — MULTIVIT WITH MINERALS/LUTEIN
250 TABLET ORAL 2 TIMES DAILY
COMMUNITY

## 2022-10-24 RX ORDER — ATOMOXETINE 18 MG/1
18 CAPSULE ORAL DAILY
Qty: 30 CAPSULE | Refills: 3 | Status: SHIPPED | OUTPATIENT
Start: 2022-10-24

## 2022-10-24 NOTE — PROGRESS NOTES
Walter Best (:  1990) is a 29 y.o. female,Established patient, here for evaluation of the following chief complaint(s):  Discuss Medications      ASSESSMENT/PLAN:  1. Attention deficit hyperactivity disorder (ADHD), combined type  Assessment & Plan:   Ok to start straterra again, follow up in 4 weeks  Orders:  -     atomoxetine (STRATTERA) 18 MG capsule; Take 1 capsule by mouth daily, Disp-30 capsule, R-3Normal    No follow-ups on file. SUBJECTIVE/OBJECTIVE:  Dx with ADHD in 3rd grade. Took meds until age 23. Took combo med concert and strattera historically. MD took her off said she didn't need it Stopped maybe  was told she was well and didn't need it so she stopped. Since that time she's failing college, cant't keep task oriented. Forgetting things again as previous. Current Outpatient Medications   Medication Sig Dispense Refill    Ascorbic Acid (VITAMIN C) 250 MG tablet Take 250 mg by mouth in the morning and at bedtime      atomoxetine (STRATTERA) 18 MG capsule Take 1 capsule by mouth daily 30 capsule 3     No current facility-administered medications for this visit. Review of Systems   All other systems reviewed and are negative. Vitals:    10/24/22 1340   BP: 128/80   Site: Left Upper Arm   Position: Sitting   Cuff Size: Large Adult   Pulse: 61   Temp: 96.9 °F (36.1 °C)   SpO2: 99%   Weight: 257 lb (116.6 kg)       Physical Exam  Constitutional:       Appearance: Normal appearance. She is well-developed and normal weight. HENT:      Head: Normocephalic. Right Ear: Tympanic membrane normal.      Left Ear: Tympanic membrane normal.      Mouth/Throat:      Mouth: Mucous membranes are moist.   Eyes:      Pupils: Pupils are equal, round, and reactive to light. Cardiovascular:      Rate and Rhythm: Normal rate and regular rhythm. Heart sounds: Normal heart sounds. Pulmonary:      Effort: Pulmonary effort is normal.      Breath sounds: Normal breath sounds. Musculoskeletal:         General: Normal range of motion. Cervical back: Normal range of motion. Skin:     General: Skin is warm and dry. Neurological:      Mental Status: She is alert and oriented to person, place, and time. An electronic signature was used to authenticate this note.     --JANEY Nguyen - CNP

## 2022-11-10 ENCOUNTER — PATIENT MESSAGE (OUTPATIENT)
Dept: FAMILY MEDICINE CLINIC | Age: 32
End: 2022-11-10

## 2022-11-10 ENCOUNTER — TELEMEDICINE (OUTPATIENT)
Dept: FAMILY MEDICINE CLINIC | Age: 32
End: 2022-11-10
Payer: COMMERCIAL

## 2022-11-10 DIAGNOSIS — U07.1 COVID-19 VIRUS INFECTION: ICD-10-CM

## 2022-11-10 DIAGNOSIS — J06.9 ACUTE URI: Primary | ICD-10-CM

## 2022-11-10 PROCEDURE — 99422 OL DIG E/M SVC 11-20 MIN: CPT | Performed by: FAMILY MEDICINE

## 2022-11-10 ASSESSMENT — PATIENT HEALTH QUESTIONNAIRE - PHQ9
SUM OF ALL RESPONSES TO PHQ QUESTIONS 1-9: 0
SUM OF ALL RESPONSES TO PHQ QUESTIONS 1-9: 0
10. IF YOU CHECKED OFF ANY PROBLEMS, HOW DIFFICULT HAVE THESE PROBLEMS MADE IT FOR YOU TO DO YOUR WORK, TAKE CARE OF THINGS AT HOME, OR GET ALONG WITH OTHER PEOPLE: 0
5. POOR APPETITE OR OVEREATING: 0
1. LITTLE INTEREST OR PLEASURE IN DOING THINGS: 0
3. TROUBLE FALLING OR STAYING ASLEEP: 0
6. FEELING BAD ABOUT YOURSELF - OR THAT YOU ARE A FAILURE OR HAVE LET YOURSELF OR YOUR FAMILY DOWN: 0
9. THOUGHTS THAT YOU WOULD BE BETTER OFF DEAD, OR OF HURTING YOURSELF: 0
SUM OF ALL RESPONSES TO PHQ9 QUESTIONS 1 & 2: 0
8. MOVING OR SPEAKING SO SLOWLY THAT OTHER PEOPLE COULD HAVE NOTICED. OR THE OPPOSITE, BEING SO FIGETY OR RESTLESS THAT YOU HAVE BEEN MOVING AROUND A LOT MORE THAN USUAL: 0
SUM OF ALL RESPONSES TO PHQ QUESTIONS 1-9: 0
2. FEELING DOWN, DEPRESSED OR HOPELESS: 0
SUM OF ALL RESPONSES TO PHQ QUESTIONS 1-9: 0
4. FEELING TIRED OR HAVING LITTLE ENERGY: 0
7. TROUBLE CONCENTRATING ON THINGS, SUCH AS READING THE NEWSPAPER OR WATCHING TELEVISION: 0

## 2022-11-10 NOTE — TELEPHONE ENCOUNTER
From: Cyn Jones  To: Pippa Mercado  Sent: 11/10/2022 8:22 AM EST  Subject: Covid     Hello,   I have tested positive for covid last night and not feeling great I am not sure if I am supposed to do anything or just treat this like flu. I tried calling and scheduling a video meeting but it would not allow me to. I am currently taking cold medicine rightnow.     Thank you   Oli Galvan

## 2022-11-10 NOTE — PROGRESS NOTES
11/10/2022    TELEHEALTH EVALUATION -- Audio[facility]Visual [facility])    HPI:    Lyn Ordaz (:  1990) has requested an audio/video evaluation for the following concern(s):    Patient started with symptoms of head congestion, runny nose sore throat 2 days ago. Today she tested herself for COVID-19 infection and was found to be positive. She is working from home. Isolation and symptomatic management discussed with her. She has been taking Tylenol severe cold and sinus DayQuil NyQuil combination and seems to be helping her little bit. No worsening cough wheezing shortness of breath any in the significant symptoms. No fever or chills today. No worsening symptoms today. Recommend continuing the same medications for now. Recommended to call back if symptoms do not improve or worsen. Isolation and return back to regular activity discussed with the patient    Review of Systems  Pertinent positive and negative symptoms noted in HPI    Prior to Visit Medications    Medication Sig Taking? Authorizing Provider   Ascorbic Acid (VITAMIN C) 250 MG tablet Take 250 mg by mouth in the morning and at bedtime Yes Historical Provider, MD   atomoxetine (STRATTERA) 18 MG capsule Take 1 capsule by mouth daily Yes JANEY Franklin - CNP       Past medical,surgical,social, Family history reviewed and updated in EMR as of today.     PHYSICAL EXAMINATION:  [ INSTRUCTIONS:  \"[x]\" Indicates a positive item  \"[]\" Indicates a negative item  -- DELETE ALL ITEMS NOT EXAMINED]  Vital Signs: (As obtained by patient/caregiver or practitioner observation)    Blood pressure-  Heart rate-    Respiratory rate-    Temperature-  Pulse oximetry-     Constitutional: [x] Appears well-developed and well-nourished [] No apparent distress      [] Abnormal-   Mental status  [x] Alert and awake  [] Oriented to person/place/time []Able to follow commands      Eyes:  EOM    [x]  Normal  [] Abnormal-  Sclera  [x] Normal  [] Abnormal -         Discharge []  None visible  [] Abnormal -    HENT:   [x] Normocephalic, atraumatic. [] Abnormal   [] Mouth/Throat: Mucous membranes are moist.     External Ears [x] Normal  [] Abnormal-     Neck: [x] No visualized mass     Pulmonary/Chest: [x] Respiratory effort normal.  [x] No visualized signs of difficulty breathing or respiratory distress        [] Abnormal-      Musculoskeletal:   [] Normal gait with no signs of ataxia         [] Normal range of motion of neck        [] Abnormal-       Neurological:        [x] No Facial Asymmetry (Cranial nerve 7 motor function) (limited exam to video visit)          [x] No gaze palsy        [] Abnormal-         Skin:        [x] No significant exanthematous lesions or discoloration noted on facial skin         [] Abnormal-            Psychiatric:       [x] Normal Affect [x] No Hallucinations        [] Abnormal-     Other pertinent observable physical exam findings-     ASSESSMENT/PLAN:    Cheryln Hammans was seen today for other. Diagnoses and all orders for this visit:    Acute URI    COVID-19 virus infection         Return if symptoms worsen or fail to improve. Eulalia Mosley was evaluated through a synchronous (real-time) audio-video encounter. The patient (or guardian if applicable) is aware that this is a billable service, which includes applicable co-pays. This Virtual Visit was conducted with patient's (and/or legal guardian's) consent. The visit was conducted pursuant to the emergency declaration under the [facility], [address] [place] [address] authority and the [employer] Act. Patient identification was verified, and a caregiver was present when appropriate. The patient was located at Home: [address] [address]. Provider was located at [facility] ([address] [place] [address]): [phone] [place] [address]  [address] [place] [address],  [address] [place] [address] Ave.        Total time spent on this encounter: 15 minutes    --Darcei Dimas MD on 11/10/2022 at 1:14 PM    An electronic signature was used to authenticate this note.

## 2022-12-08 DIAGNOSIS — F90.2 ATTENTION DEFICIT HYPERACTIVITY DISORDER (ADHD), COMBINED TYPE: ICD-10-CM

## 2022-12-08 RX ORDER — ATOMOXETINE 40 MG/1
40 CAPSULE ORAL DAILY
Qty: 90 CAPSULE | Refills: 0 | Status: SHIPPED | OUTPATIENT
Start: 2022-12-08

## 2022-12-30 DIAGNOSIS — F90.2 ATTENTION DEFICIT HYPERACTIVITY DISORDER (ADHD), COMBINED TYPE: ICD-10-CM

## 2023-01-25 DIAGNOSIS — F90.2 ATTENTION DEFICIT HYPERACTIVITY DISORDER (ADHD), COMBINED TYPE: ICD-10-CM

## 2023-01-25 RX ORDER — ATOMOXETINE 40 MG/1
40 CAPSULE ORAL DAILY
Qty: 90 CAPSULE | Refills: 0 | Status: SHIPPED | OUTPATIENT
Start: 2023-01-25

## 2023-06-29 ENCOUNTER — OFFICE VISIT (OUTPATIENT)
Dept: FAMILY MEDICINE CLINIC | Age: 33
End: 2023-06-29
Payer: COMMERCIAL

## 2023-06-29 VITALS
DIASTOLIC BLOOD PRESSURE: 80 MMHG | HEIGHT: 63 IN | SYSTOLIC BLOOD PRESSURE: 122 MMHG | OXYGEN SATURATION: 98 % | BODY MASS INDEX: 43.94 KG/M2 | WEIGHT: 248 LBS | HEART RATE: 77 BPM | TEMPERATURE: 98.6 F

## 2023-06-29 DIAGNOSIS — H69.83 DYSFUNCTION OF BOTH EUSTACHIAN TUBES: ICD-10-CM

## 2023-06-29 PROBLEM — H69.93 DYSFUNCTION OF BOTH EUSTACHIAN TUBES: Status: ACTIVE | Noted: 2023-06-29

## 2023-06-29 PROCEDURE — 99213 OFFICE O/P EST LOW 20 MIN: CPT | Performed by: NURSE PRACTITIONER

## 2023-06-29 RX ORDER — CHLORAL HYDRATE 500 MG
CAPSULE ORAL DAILY
COMMUNITY

## 2023-06-29 RX ORDER — MECLIZINE HYDROCHLORIDE 25 MG/1
25 TABLET ORAL 3 TIMES DAILY PRN
Qty: 15 TABLET | Refills: 0 | Status: SHIPPED | OUTPATIENT
Start: 2023-06-29 | End: 2023-07-09

## 2023-06-29 RX ORDER — METHYLPREDNISOLONE 4 MG/1
TABLET ORAL
Qty: 1 KIT | Refills: 0 | Status: SHIPPED | OUTPATIENT
Start: 2023-06-29 | End: 2023-07-05

## 2023-06-29 RX ORDER — FLUTICASONE PROPIONATE 50 MCG
2 SPRAY, SUSPENSION (ML) NASAL DAILY
Qty: 48 G | Refills: 1 | Status: SHIPPED | OUTPATIENT
Start: 2023-06-29

## 2023-06-29 SDOH — ECONOMIC STABILITY: FOOD INSECURITY: WITHIN THE PAST 12 MONTHS, THE FOOD YOU BOUGHT JUST DIDN'T LAST AND YOU DIDN'T HAVE MONEY TO GET MORE.: NEVER TRUE

## 2023-06-29 SDOH — ECONOMIC STABILITY: FOOD INSECURITY: WITHIN THE PAST 12 MONTHS, YOU WORRIED THAT YOUR FOOD WOULD RUN OUT BEFORE YOU GOT MONEY TO BUY MORE.: NEVER TRUE

## 2023-06-29 SDOH — ECONOMIC STABILITY: HOUSING INSECURITY
IN THE LAST 12 MONTHS, WAS THERE A TIME WHEN YOU DID NOT HAVE A STEADY PLACE TO SLEEP OR SLEPT IN A SHELTER (INCLUDING NOW)?: NO

## 2023-06-29 SDOH — ECONOMIC STABILITY: INCOME INSECURITY: HOW HARD IS IT FOR YOU TO PAY FOR THE VERY BASICS LIKE FOOD, HOUSING, MEDICAL CARE, AND HEATING?: NOT HARD AT ALL

## 2023-06-29 ASSESSMENT — ENCOUNTER SYMPTOMS
ABDOMINAL PAIN: 0
SHORTNESS OF BREATH: 0
COLOR CHANGE: 0
CHEST TIGHTNESS: 0
DIARRHEA: 0
SINUS PRESSURE: 0
WHEEZING: 0
EYE REDNESS: 0
RHINORRHEA: 0
BACK PAIN: 0
BLOOD IN STOOL: 0
COUGH: 0
VOMITING: 0
SORE THROAT: 0
EYE ITCHING: 0
NAUSEA: 0
CONSTIPATION: 0

## 2023-06-29 ASSESSMENT — PATIENT HEALTH QUESTIONNAIRE - PHQ9
7. TROUBLE CONCENTRATING ON THINGS, SUCH AS READING THE NEWSPAPER OR WATCHING TELEVISION: 0
10. IF YOU CHECKED OFF ANY PROBLEMS, HOW DIFFICULT HAVE THESE PROBLEMS MADE IT FOR YOU TO DO YOUR WORK, TAKE CARE OF THINGS AT HOME, OR GET ALONG WITH OTHER PEOPLE: 0
9. THOUGHTS THAT YOU WOULD BE BETTER OFF DEAD, OR OF HURTING YOURSELF: 0
SUM OF ALL RESPONSES TO PHQ QUESTIONS 1-9: 0
6. FEELING BAD ABOUT YOURSELF - OR THAT YOU ARE A FAILURE OR HAVE LET YOURSELF OR YOUR FAMILY DOWN: 0
8. MOVING OR SPEAKING SO SLOWLY THAT OTHER PEOPLE COULD HAVE NOTICED. OR THE OPPOSITE, BEING SO FIGETY OR RESTLESS THAT YOU HAVE BEEN MOVING AROUND A LOT MORE THAN USUAL: 0
SUM OF ALL RESPONSES TO PHQ QUESTIONS 1-9: 0
2. FEELING DOWN, DEPRESSED OR HOPELESS: 0
5. POOR APPETITE OR OVEREATING: 0
SUM OF ALL RESPONSES TO PHQ QUESTIONS 1-9: 0
SUM OF ALL RESPONSES TO PHQ9 QUESTIONS 1 & 2: 0
4. FEELING TIRED OR HAVING LITTLE ENERGY: 0
DEPRESSION UNABLE TO ASSESS: FUNCTIONAL CAPACITY MOTIVATION LIMITS ACCURACY
3. TROUBLE FALLING OR STAYING ASLEEP: 0
1. LITTLE INTEREST OR PLEASURE IN DOING THINGS: 0
SUM OF ALL RESPONSES TO PHQ QUESTIONS 1-9: 0

## 2023-08-13 DIAGNOSIS — F90.2 ATTENTION DEFICIT HYPERACTIVITY DISORDER (ADHD), COMBINED TYPE: ICD-10-CM

## 2023-08-14 RX ORDER — ATOMOXETINE 40 MG/1
40 CAPSULE ORAL DAILY
Qty: 90 CAPSULE | Refills: 0 | Status: SHIPPED | OUTPATIENT
Start: 2023-08-14

## 2023-12-12 ENCOUNTER — OFFICE VISIT (OUTPATIENT)
Dept: FAMILY MEDICINE CLINIC | Age: 33
End: 2023-12-12
Payer: COMMERCIAL

## 2023-12-12 ENCOUNTER — HOSPITAL ENCOUNTER (OUTPATIENT)
Dept: GENERAL RADIOLOGY | Age: 33
Discharge: HOME | End: 2023-12-12
Payer: COMMERCIAL

## 2023-12-12 VITALS
DIASTOLIC BLOOD PRESSURE: 88 MMHG | HEART RATE: 88 BPM | SYSTOLIC BLOOD PRESSURE: 118 MMHG | WEIGHT: 250 LBS | BODY MASS INDEX: 44.29 KG/M2 | OXYGEN SATURATION: 99 % | TEMPERATURE: 98 F

## 2023-12-12 DIAGNOSIS — M54.12 CERVICAL RADICULOPATHY: ICD-10-CM

## 2023-12-12 DIAGNOSIS — M54.12 CERVICAL RADICULOPATHY: Primary | ICD-10-CM

## 2023-12-12 PROCEDURE — 99214 OFFICE O/P EST MOD 30 MIN: CPT | Performed by: NURSE PRACTITIONER

## 2023-12-12 PROCEDURE — 72050 X-RAY EXAM NECK SPINE 4/5VWS: CPT

## 2023-12-12 ASSESSMENT — ENCOUNTER SYMPTOMS
COLOR CHANGE: 0
CONSTIPATION: 0
BACK PAIN: 0
SHORTNESS OF BREATH: 0
ABDOMINAL PAIN: 0
CHEST TIGHTNESS: 0
SORE THROAT: 0
EYE REDNESS: 0
BLOOD IN STOOL: 0
NAUSEA: 0
RHINORRHEA: 0
COUGH: 0
DIARRHEA: 0
WHEEZING: 0
EYE ITCHING: 0
VOMITING: 0
SINUS PRESSURE: 0

## 2023-12-12 NOTE — ASSESSMENT & PLAN NOTE
Do not appreciate any neurological deficits on exam however she does exhibit weakness to bilateral upper extremities. At this time would like to proceed with an x-ray to evaluate for any disc impingement or spondylosis. Given the radiculopathy however I do feel that an MRI would be warranted. Proceed with imaging at this time and if needed we will move forward with MRI.

## 2023-12-12 NOTE — PROGRESS NOTES
Jamal Nicholas (:  1990) is a 34 y.o. female,Established patient, here for evaluation of the following chief complaint(s):  Arm Pain (Wrist and shoulder, in both arms. Pt says she's having poor circulation in both arm especially in right arm, x 3 weeks. Pain is extreme at night.) and Neck Pain      ASSESSMENT/PLAN:  1. Cervical radiculopathy  Assessment & Plan:   Do not appreciate any neurological deficits on exam however she does exhibit weakness to bilateral upper extremities. At this time would like to proceed with an x-ray to evaluate for any disc impingement or spondylosis. Given the radiculopathy however I do feel that an MRI would be warranted. Proceed with imaging at this time and if needed we will move forward with MRI. Orders:  -     XR CERVICAL SPINE (4-5 VIEWS); Future  -     MRI CERVICAL SPINE WO CONTRAST; Future      No follow-ups on file. SUBJECTIVE/OBJECTIVE:    Patient the office today with reports of neck and bilateral upper extremity pain. She states 3 weeks ago she started to experience pain in her shoulders that is radiating into her wrist and fingers. She describes the symptoms of radiculopathy like a rubber band has been tied around her finger and losing circulation. The pain is severe and that \the left side is worse than the right. She did try tylenol for the pain without any improvement in her symptoms.  She denies any trauma, states that she has just been exercising, but nothing recently that caused any injury   Current Outpatient Medications   Medication Sig Dispense Refill    atomoxetine (STRATTERA) 40 MG capsule TAKE 1 CAPSULE BY MOUTH DAILY 90 capsule 0    Omega-3 Fatty Acids (FISH OIL) 1000 MG capsule Take by mouth daily      fluticasone (FLONASE) 50 MCG/ACT nasal spray 2 sprays by Each Nostril route daily 48 g 1    Ascorbic Acid (VITAMIN C) 250 MG tablet Take 1 tablet by mouth in the morning and at bedtime       No current facility-administered medications for

## 2024-01-15 ENCOUNTER — APPOINTMENT (OUTPATIENT)
Dept: GENERAL RADIOLOGY | Age: 34
End: 2024-01-15
Payer: COMMERCIAL

## 2024-01-15 ENCOUNTER — APPOINTMENT (OUTPATIENT)
Dept: CT IMAGING | Age: 34
End: 2024-01-15
Payer: COMMERCIAL

## 2024-01-15 ENCOUNTER — HOSPITAL ENCOUNTER (EMERGENCY)
Age: 34
Discharge: HOME | End: 2024-01-15
Attending: STUDENT IN AN ORGANIZED HEALTH CARE EDUCATION/TRAINING PROGRAM
Payer: COMMERCIAL

## 2024-01-15 VITALS
BODY MASS INDEX: 45.07 KG/M2 | OXYGEN SATURATION: 97 % | SYSTOLIC BLOOD PRESSURE: 108 MMHG | HEART RATE: 103 BPM | RESPIRATION RATE: 22 BRPM | TEMPERATURE: 98.3 F | WEIGHT: 254.4 LBS | HEIGHT: 63 IN | DIASTOLIC BLOOD PRESSURE: 60 MMHG

## 2024-01-15 DIAGNOSIS — R06.02 SHORTNESS OF BREATH: Primary | ICD-10-CM

## 2024-01-15 DIAGNOSIS — R05.1 ACUTE COUGH: ICD-10-CM

## 2024-01-15 DIAGNOSIS — M79.10 MYALGIA: ICD-10-CM

## 2024-01-15 DIAGNOSIS — R06.00 DYSPNEA, UNSPECIFIED TYPE: ICD-10-CM

## 2024-01-15 LAB
ANION GAP SERPL CALCULATED.3IONS-SCNC: 8 MMOL/L (ref 3–16)
BASOPHILS # BLD: 0 K/UL (ref 0–0.2)
BASOPHILS NFR BLD: 0.6 %
BUN SERPL-MCNC: 9 MG/DL (ref 7–20)
CALCIUM SERPL-MCNC: 8.4 MG/DL (ref 8.3–10.6)
CHLORIDE SERPL-SCNC: 102 MMOL/L (ref 99–110)
CO2 SERPL-SCNC: 22 MMOL/L (ref 21–32)
CREAT SERPL-MCNC: 0.7 MG/DL (ref 0.6–1.1)
D DIMER: 0.91 UG/ML FEU (ref 0–0.6)
DEPRECATED RDW RBC AUTO: 14.9 % (ref 12.4–15.4)
EOSINOPHIL # BLD: 0.1 K/UL (ref 0–0.6)
EOSINOPHIL NFR BLD: 0.7 %
FLUAV RNA RESP QL NAA+PROBE: NOT DETECTED
FLUBV RNA RESP QL NAA+PROBE: NOT DETECTED
GFR SERPLBLD CREATININE-BSD FMLA CKD-EPI: >60 ML/MIN/{1.73_M2}
GLUCOSE SERPL-MCNC: 90 MG/DL (ref 70–99)
HCG SERPL QL: NEGATIVE
HCT VFR BLD AUTO: 37.1 % (ref 36–48)
HETEROPH AB BLD QL IA: NEGATIVE
HGB BLD-MCNC: 12.2 G/DL (ref 12–16)
LYMPHOCYTES # BLD: 1.4 K/UL (ref 1–5.1)
LYMPHOCYTES NFR BLD: 18.2 %
MCH RBC QN AUTO: 26.4 PG (ref 26–34)
MCHC RBC AUTO-ENTMCNC: 32.9 G/DL (ref 31–36)
MCV RBC AUTO: 80.2 FL (ref 80–100)
MONOCYTES # BLD: 0.5 K/UL (ref 0–1.3)
MONOCYTES NFR BLD: 6.6 %
NEUTROPHILS # BLD: 5.7 K/UL (ref 1.7–7.7)
NEUTROPHILS NFR BLD: 73.9 %
PLATELET # BLD AUTO: 272 K/UL (ref 135–450)
PMV BLD AUTO: 8.9 FL (ref 5–10.5)
POTASSIUM SERPL-SCNC: 5.8 MMOL/L (ref 3.5–5.1)
RBC # BLD AUTO: 4.63 M/UL (ref 4–5.2)
SARS-COV-2 RNA RESP QL NAA+PROBE: NOT DETECTED
SODIUM SERPL-SCNC: 132 MMOL/L (ref 136–145)
WBC # BLD AUTO: 7.7 K/UL (ref 4–11)

## 2024-01-15 PROCEDURE — 87636 SARSCOV2 & INF A&B AMP PRB: CPT

## 2024-01-15 PROCEDURE — 96374 THER/PROPH/DIAG INJ IV PUSH: CPT

## 2024-01-15 PROCEDURE — 86308 HETEROPHILE ANTIBODY SCREEN: CPT

## 2024-01-15 PROCEDURE — 85025 COMPLETE CBC W/AUTO DIFF WBC: CPT

## 2024-01-15 PROCEDURE — 80048 BASIC METABOLIC PNL TOTAL CA: CPT

## 2024-01-15 PROCEDURE — 71275 CT ANGIOGRAPHY CHEST: CPT

## 2024-01-15 PROCEDURE — 36415 COLL VENOUS BLD VENIPUNCTURE: CPT

## 2024-01-15 PROCEDURE — 6360000002 HC RX W HCPCS: Performed by: STUDENT IN AN ORGANIZED HEALTH CARE EDUCATION/TRAINING PROGRAM

## 2024-01-15 PROCEDURE — 84703 CHORIONIC GONADOTROPIN ASSAY: CPT

## 2024-01-15 PROCEDURE — 2580000003 HC RX 258: Performed by: STUDENT IN AN ORGANIZED HEALTH CARE EDUCATION/TRAINING PROGRAM

## 2024-01-15 PROCEDURE — 99285 EMERGENCY DEPT VISIT HI MDM: CPT

## 2024-01-15 PROCEDURE — 71046 X-RAY EXAM CHEST 2 VIEWS: CPT

## 2024-01-15 PROCEDURE — 6360000004 HC RX CONTRAST MEDICATION: Performed by: EMERGENCY MEDICINE

## 2024-01-15 PROCEDURE — 6370000000 HC RX 637 (ALT 250 FOR IP): Performed by: STUDENT IN AN ORGANIZED HEALTH CARE EDUCATION/TRAINING PROGRAM

## 2024-01-15 PROCEDURE — 85379 FIBRIN DEGRADATION QUANT: CPT

## 2024-01-15 PROCEDURE — 96361 HYDRATE IV INFUSION ADD-ON: CPT

## 2024-01-15 RX ORDER — ACETAMINOPHEN 500 MG
1000 TABLET ORAL
Status: COMPLETED | OUTPATIENT
Start: 2024-01-15 | End: 2024-01-15

## 2024-01-15 RX ORDER — KETOROLAC TROMETHAMINE 30 MG/ML
15 INJECTION, SOLUTION INTRAMUSCULAR; INTRAVENOUS ONCE
Status: COMPLETED | OUTPATIENT
Start: 2024-01-15 | End: 2024-01-15

## 2024-01-15 RX ORDER — SODIUM CHLORIDE, SODIUM LACTATE, POTASSIUM CHLORIDE, AND CALCIUM CHLORIDE .6; .31; .03; .02 G/100ML; G/100ML; G/100ML; G/100ML
1000 INJECTION, SOLUTION INTRAVENOUS ONCE
Status: COMPLETED | OUTPATIENT
Start: 2024-01-15 | End: 2024-01-15

## 2024-01-15 RX ADMIN — KETOROLAC TROMETHAMINE 15 MG: 30 INJECTION, SOLUTION INTRAMUSCULAR; INTRAVENOUS at 13:46

## 2024-01-15 RX ADMIN — SODIUM CHLORIDE, POTASSIUM CHLORIDE, SODIUM LACTATE AND CALCIUM CHLORIDE 1000 ML: 600; 310; 30; 20 INJECTION, SOLUTION INTRAVENOUS at 13:45

## 2024-01-15 RX ADMIN — IOPAMIDOL 75 ML: 755 INJECTION, SOLUTION INTRAVENOUS at 15:28

## 2024-01-15 RX ADMIN — ACETAMINOPHEN 1000 MG: 500 TABLET ORAL at 13:45

## 2024-01-15 ASSESSMENT — ENCOUNTER SYMPTOMS
PHOTOPHOBIA: 0
COUGH: 1
ABDOMINAL PAIN: 0
TROUBLE SWALLOWING: 1
VOMITING: 0
SHORTNESS OF BREATH: 1
NAUSEA: 0
SINUS PAIN: 1
SORE THROAT: 1

## 2024-01-15 ASSESSMENT — PAIN SCALES - GENERAL
PAINLEVEL_OUTOF10: 6
PAINLEVEL_OUTOF10: 6

## 2024-01-15 ASSESSMENT — PAIN DESCRIPTION - DESCRIPTORS: DESCRIPTORS: ACHING

## 2024-01-15 NOTE — ED PROVIDER NOTES
THE [facility]  EMERGENCY DEPARTMENT ENCOUNTER          ATTENDING PHYSICIAN NOTE       Date of evaluation: 1/15/2024    ADDENDUM:      Care of this patient was assumed from Dr. Egan.  The patient was seen for Shortness of Breath (Patient comes to the ED today for shortness of breathe with exertion. Patient also has complaints of body aches. )  .  The patient's initial evaluation and plan have been discussed with the prior provider who initially evaluated the patient.  Nursing Notes, Past Medical Hx, Past Surgical Hx, Social Hx, Allergies, and Family Hx were all reviewed.    ASSESSMENT / PLAN  (MEDICAL DECISION MAKING)     Kathy Oconnor is a 34 y.o. female with anxiety and depression here with exertional SOB and body aches.  CXR and COVID/Flu negative.  Tachycardia persisted  so D-dimer sent and somewhat elevated and therefore CTPA performed.  CTPA was negative.  The patient felt somewhat improved with administration of IV fluids.  With no evidence of an infectious process at this time, is felt she can be discharged home managing her symptoms with over-the-counter medications as needed      Medical Decision Making  Amount and/or Complexity of Data Reviewed  Labs: ordered.  Radiology: ordered.    Risk  OTC drugs.  Prescription drug management.      Clinical Impression     1. Shortness of breath    2. Acute cough    3. Myalgia    4. Dyspnea, unspecified type        Disposition     PATIENT REFERRED TO:  Debra Bean, APRN - CNP  [id number] [id number] [phone]    In 3 days  if not improving      DISCHARGE MEDICATIONS:  Current Discharge Medication List          DISPOSITION Decision To Discharge 01/15/2024 04:01:32 PM        Diagnostic Results and Other Data                                   RADIOLOGY:  CTA Chest W/ Contrast R/O PE   Final Result      1.  No evidence of pulmonary embolism.   2.  Trace bilateral pleural effusions and mild dependent atelectasis.

## 2024-01-15 NOTE — ED PROVIDER NOTES
THE [facility]  EMERGENCY DEPARTMENT ENCOUNTER          ATTENDING PHYSICIAN NOTE       Date of evaluation: 1/15/2024    Chief Complaint     Shortness of Breath (Patient comes to the ED today for shortness of breathe with exertion. Patient also has complaints of body aches. )      History of Present Illness     Kathy Oconnor is a 34 y.o. female who presents with multiple flulike symptoms.  She reports 3 to 4 days of symptoms and describes an initial sore throat, which subsequently developed into a cough, diffuse bodyaches, subjective fevers and this morning developed shortness of breath.  She has been taking NyQuil for symptoms.  She did test negative for COVID.  She received both flu and COVID vaccines this year.  She denies any known sick contacts.  She denies headache but does have some sinus pressure.  Denies any chest pain or pleuritic pain.  She denies abdominal pain, nausea, vomiting.  She has no history of DVT/PE.  Denies pain in her calves or swelling of her legs.  She does not smoke or use estrogen products.    ASSESSMENT / PLAN  (MEDICAL DECISION MAKING)     INITIAL VITALS: BP: 127/81, Temp: 98.3 °F (36.8 °C), Pulse: (!) 103, Respirations: 22, SpO2: 97 %      Kathy Oconnor is a 34 y.o. female who is presenting with several days of increasing flulike symptoms, including sore throat, cough, body aches, fevers and shortness of breath.  She has tested negative for COVID and has received flu and COVID vaccines.  She has no history of similar symptoms.  She is not having any augustine chest pain but does feel like it is difficult to catch her breath.  On presentation, patient is mildly tachycardic in the low 100s.  She is afebrile and normotensive.  She has no significant hypoxia on room air.  She appears tired but is overall nontoxic.  Her lungs are clear to auscultation bilaterally.  She has no notable leg swelling or calf tenderness.  Her abdomen is benign.  Her throat and posterior

## 2024-02-13 ENCOUNTER — TELEPHONE (OUTPATIENT)
Dept: FAMILY MEDICINE CLINIC | Age: 34
End: 2024-02-13

## 2024-02-13 ENCOUNTER — HOSPITAL ENCOUNTER (OUTPATIENT)
Dept: VASCULAR LAB | Age: 34
Discharge: HOME | End: 2024-02-13
Payer: COMMERCIAL

## 2024-02-13 ENCOUNTER — OFFICE VISIT (OUTPATIENT)
Dept: FAMILY MEDICINE CLINIC | Age: 34
End: 2024-02-13
Payer: COMMERCIAL

## 2024-02-13 VITALS
HEART RATE: 92 BPM | DIASTOLIC BLOOD PRESSURE: 72 MMHG | SYSTOLIC BLOOD PRESSURE: 116 MMHG | TEMPERATURE: 97.1 F | WEIGHT: 252 LBS | BODY MASS INDEX: 44.64 KG/M2 | OXYGEN SATURATION: 99 %

## 2024-02-13 DIAGNOSIS — F33.2 SEVERE EPISODE OF RECURRENT MAJOR DEPRESSIVE DISORDER, WITHOUT PSYCHOTIC FEATURES (HCC): ICD-10-CM

## 2024-02-13 DIAGNOSIS — M79.605 LEFT LEG PAIN: Primary | ICD-10-CM

## 2024-02-13 DIAGNOSIS — M79.605 LEFT LEG PAIN: ICD-10-CM

## 2024-02-13 PROCEDURE — 99214 OFFICE O/P EST MOD 30 MIN: CPT | Performed by: NURSE PRACTITIONER

## 2024-02-13 PROCEDURE — 93971 EXTREMITY STUDY: CPT

## 2024-02-13 NOTE — ASSESSMENT & PLAN NOTE
Patient reports increased depression and anxiety with illness. Today she is Stable, controlled. No changes. Continue current treatment plan

## 2024-02-13 NOTE — TELEPHONE ENCOUNTER
Pt was able to schedule a VV with one of our providers for acute issue-- bilateral knee/leg pain. Pt states she feels like they're bruised but no visible bruising. Pt is concerned about potential clots.    I advised pt to go to ER but she wants to talk to PCP first to see if this would be appropriate. Pt states she is unable to come into the office.    Currently scheduled for VV at 1:15.

## 2024-02-13 NOTE — PROGRESS NOTES
Negative for adenopathy. Does not bruise/bleed easily.   Psychiatric/Behavioral:  Negative for confusion, sleep disturbance and suicidal ideas. The patient is not nervous/anxious and is not hyperactive.        Vitals:    02/13/24 1314   BP: 116/72   Pulse: 92   Temp: 97.1 °F (36.2 °C)   SpO2: 99%   Weight: 114.3 kg (252 lb)       Physical Exam  Constitutional:       Appearance: Normal appearance. She is well-developed.   HENT:      Head: Normocephalic and atraumatic.      Right Ear: Hearing normal.      Left Ear: Hearing normal.      Nose: No mucosal edema.      Right Sinus: No maxillary sinus tenderness or frontal sinus tenderness.      Left Sinus: No maxillary sinus tenderness or frontal sinus tenderness.      Mouth/Throat:      Tonsils: No tonsillar abscesses.   Eyes:      Extraocular Movements: Extraocular movements intact.      Pupils: Pupils are equal, round, and reactive to light.   Cardiovascular:      Rate and Rhythm: Normal rate and regular rhythm.      Pulses: Normal pulses.      Heart sounds: Normal heart sounds.   Pulmonary:      Effort: Pulmonary effort is normal.      Breath sounds: Normal breath sounds.   Musculoskeletal:      Left lower leg: Tenderness (posterior knee) present.   Lymphadenopathy:      Head:      Right side of head: No submental, submandibular, tonsillar, preauricular, posterior auricular or occipital adenopathy.      Left side of head: No submental, submandibular, tonsillar, preauricular, posterior auricular or occipital adenopathy.   Skin:     General: Skin is warm and dry.   Neurological:      Mental Status: She is alert.   Psychiatric:         Mood and Affect: Mood normal.         Behavior: Behavior normal.                 An electronic signature was used to authenticate this note.    --Debra Bean, JANEY - CNP

## 2024-02-13 NOTE — ASSESSMENT & PLAN NOTE
Significant pain and tenderness to palpation of the posterior left aspect of the lower extremity.  She did have an elevated D-dimer recently prior to flying.  After her flight and return from vacation the pain began in her left lower extremity.  Given the circumstances in the setting of being hypercoagulable we will proceed with a venous Doppler to rule out DVT in the left lower extremity.  Further recommendations following these results.

## 2024-03-02 ENCOUNTER — OFFICE VISIT (OUTPATIENT)
Dept: ORTHOPEDIC SURGERY | Age: 34
End: 2024-03-02

## 2024-03-02 VITALS — BODY MASS INDEX: 44.65 KG/M2 | WEIGHT: 252 LBS | HEIGHT: 63 IN

## 2024-03-02 DIAGNOSIS — S93.432A SPRAIN OF TIBIOFIBULAR LIGAMENT OF LEFT ANKLE, INITIAL ENCOUNTER: ICD-10-CM

## 2024-03-02 DIAGNOSIS — M25.572 ACUTE LEFT ANKLE PAIN: Primary | ICD-10-CM

## 2024-03-02 NOTE — PROGRESS NOTES
include completion of the medical record. This time excludes any time spent performing procedures or tests in the office.                                                     Bennett Sawyer PA-C   Senior Physician Assistant   [facility]                                         Disclaimer:  This note was generated with use of a verbal recognition program (DRAGON) and an attempt was made to check for errors.  It is possible that there are still dictated errors within this office note.  If so, please bring any significant errors to my attention for an addendum.  All efforts were made to ensure that this office note is accurate.

## 2024-03-11 SDOH — HEALTH STABILITY: PHYSICAL HEALTH: ON AVERAGE, HOW MANY DAYS PER WEEK DO YOU ENGAGE IN MODERATE TO STRENUOUS EXERCISE (LIKE A BRISK WALK)?: 3 DAYS

## 2024-03-11 SDOH — HEALTH STABILITY: PHYSICAL HEALTH: ON AVERAGE, HOW MANY MINUTES DO YOU ENGAGE IN EXERCISE AT THIS LEVEL?: 20 MIN

## 2024-03-14 ENCOUNTER — OFFICE VISIT (OUTPATIENT)
Dept: ORTHOPEDIC SURGERY | Age: 34
End: 2024-03-14

## 2024-03-14 VITALS — HEIGHT: 63 IN | BODY MASS INDEX: 44.65 KG/M2 | WEIGHT: 252 LBS

## 2024-03-14 DIAGNOSIS — S93.492A SPRAIN OF ANTERIOR TALOFIBULAR LIGAMENT OF LEFT ANKLE, INITIAL ENCOUNTER: Primary | ICD-10-CM

## 2024-03-30 PROBLEM — S93.492A SPRAIN OF ANTERIOR TALOFIBULAR LIGAMENT OF LEFT ANKLE: Status: ACTIVE | Noted: 2024-03-30

## 2024-03-30 NOTE — PROGRESS NOTES
Strain (CARDIA)     Difficulty of Paying Living Expenses: Not hard at all   Food Insecurity: Not on file (6/29/2023)   Transportation Needs: Unknown (6/29/2023)    PRAPARE - Transportation     Lack of Transportation (Medical): Not on file     Lack of Transportation (Non-Medical): No   Physical Activity: Insufficiently Active (3/11/2024)    Exercise Vital Sign     Days of Exercise per Week: 3 days     Minutes of Exercise per Session: 20 min   Stress: Not on file   Social Connections: Not on file   Intimate Partner Violence: Not At Risk (3/31/2022)    Humiliation, Afraid, Rape, and Kick questionnaire     Fear of Current or Ex-Partner: No     Emotionally Abused: No     Physically Abused: No     Sexually Abused: No   Housing Stability: Unknown (6/29/2023)    Housing Stability Vital Sign     Unable to Pay for Housing in the Last Year: Not on file     Number of Places Lived in the Last Year: Not on file     Unstable Housing in the Last Year: No        Family History   Problem Relation Age of Onset    Crohn's Disease Mother     Mental Illness Mother     Heart Attack Father     Depression Father     High Blood Pressure Father     High Cholesterol Father     Substance Abuse Brother     Breast Cancer Maternal Aunt     Mental Retardation Maternal Cousin     Substance Abuse Brother         Pertinent items are noted in HPI  Review of systems reviewed from Patient History Form and available in the patient's chart under the Media tab.       PHYSICAL EXAM:  Ms. Oconnor is a very pleasant 34 y.o.  female who presents today in no acute distress, awake, alert, and oriented.  She is well dressed, nourished and  groomed.  Patient with normal affect.  Height is  1.6 m (5' 2.99\"), weight is 114.3 kg (252 lb).  Resting respiratory rate is 16.     Examination of the gait, showed that the patient walks with a boot, WB left leg .  Examination of both ankles showing a decreased range of motion of the left ankle compare to the other

## 2024-04-11 ENCOUNTER — OFFICE VISIT (OUTPATIENT)
Dept: ORTHOPEDIC SURGERY | Age: 34
End: 2024-04-11
Payer: COMMERCIAL

## 2024-04-11 VITALS — HEIGHT: 63 IN | WEIGHT: 252 LBS | BODY MASS INDEX: 44.65 KG/M2

## 2024-04-11 DIAGNOSIS — S93.492A SPRAIN OF ANTERIOR TALOFIBULAR LIGAMENT OF LEFT ANKLE, INITIAL ENCOUNTER: Primary | ICD-10-CM

## 2024-04-11 PROCEDURE — 99213 OFFICE O/P EST LOW 20 MIN: CPT | Performed by: ORTHOPAEDIC SURGERY

## 2024-04-11 NOTE — PROGRESS NOTES
ecchymosis over lateral side of the left ankle.  She has intact sensation and good pedal pulses. She has mild tenderness on deep palpation over the left ankle ATF. The ankles are stable to drawer test bilaterally, equally.  Ankle reflex 1+ bilaterally.     IMAGING:  Xray's dated 3/2/2024 from [facility],  were reviewed, 3 views of the left ankle, and showed no acute fracture. Ankle mortise in anatomic position.     IMPRESSION: Left ankle sprain.    PLAN:  I assured the patient that the xray is negative for acute fracture. The xray findings were reviewed with the patient and explained to her that the pain is 2ry to ankle sprain, and that it should continue to improve the next 3-4 weeks.  She can be WBAT, and avoid heavy impact acitivity and work on peroneal muscle strength. NSAIDs OTC. I discussed with the patient that I think that she would really benefit from a course of physical therapy for further strengthening and stretching. She would like to do it on her own. F/U in 4 weeks, PT if needed.       Nicole Perkins MD

## 2024-04-23 ENCOUNTER — OFFICE VISIT (OUTPATIENT)
Dept: ORTHOPEDIC SURGERY | Age: 34
End: 2024-04-23
Payer: COMMERCIAL

## 2024-04-23 VITALS — WEIGHT: 251.99 LBS | BODY MASS INDEX: 44.65 KG/M2 | HEIGHT: 63 IN

## 2024-04-23 DIAGNOSIS — M25.522 BILATERAL ELBOW JOINT PAIN: Primary | ICD-10-CM

## 2024-04-23 DIAGNOSIS — M25.521 BILATERAL ELBOW JOINT PAIN: Primary | ICD-10-CM

## 2024-04-23 DIAGNOSIS — M77.01 BILATERAL MEDIAL EPICONDYLITIS OF ELBOW JOINT: ICD-10-CM

## 2024-04-23 DIAGNOSIS — M77.02 BILATERAL MEDIAL EPICONDYLITIS OF ELBOW JOINT: ICD-10-CM

## 2024-04-23 PROCEDURE — 99214 OFFICE O/P EST MOD 30 MIN: CPT | Performed by: ORTHOPAEDIC SURGERY

## 2024-04-23 PROCEDURE — MISCD86 TENNIS ELBOW STRAP-BREG: Performed by: ORTHOPAEDIC SURGERY

## 2024-04-23 NOTE — PROGRESS NOTES
CHIEF COMPLAINT: Bilateral medial elbow pain/ medial epicondylitis.    HISTORY:  Ms. Oconnor 34 y.o.  female right handed presents today for the first visit for evaluation of bilateral medial elbow pain which started in November, but worsened last week.  She is complaining of achy pain that can radiate down her arm and make her hands numb. Pain wakes her from sleep.  Pain is increase with lifting weights and moving the wrist. Pain is dull achy pain by the end of the day. Mild radiation to the forearm and has numbness and tingling sensation in her entire hand intermittently. No other complaint.  No h/o trauma or gout.  She is well-known to me for left ankle ATF sprain on 3/1/2024 and is doing well with that.  Denies smoking.    Past Medical History:   Diagnosis Date    ADHD (attention deficit hyperactivity disorder)     3rd grade    Anxiety     Bacterial vaginosis 2013    CAP (community acquired pneumonia) 2015    RLL    Depression        Past Surgical History:   Procedure Laterality Date    TONSILLECTOMY         Social History     Socioeconomic History    Marital status: Single     Spouse name: Not on file    Number of children: Not on file    Years of education: Not on file    Highest education level: Not on file   Occupational History    Occupation: manager of [employer]     Comment: lives with boyfriend     Comment: Mom has MS   Tobacco Use    Smoking status: Former     Current packs/day: 0.00     Average packs/day: 0.2 packs/day for 1 year (0.2 ttl pk-yrs)     Types: Cigarettes     Start date: 2007     Quit date: 2008     Years since quittin.3    Smokeless tobacco: Never   Substance and Sexual Activity    Alcohol use: Yes     Alcohol/week: 2.0 - 3.0 standard drinks of alcohol     Types: 2 - 3 Cans of beer per week     Comment: occ    Drug use: No    Sexual activity: Yes     Partners: Male   Other Topics Concern    Not on file   Social History Narrative    Not

## 2024-07-31 ENCOUNTER — TELEMEDICINE ON DEMAND (OUTPATIENT)
Age: 34
End: 2024-07-31
Payer: COMMERCIAL

## 2024-07-31 DIAGNOSIS — H53.8 BLURRED VISION: Primary | ICD-10-CM

## 2024-07-31 DIAGNOSIS — G43.909 MIGRAINE WITHOUT STATUS MIGRAINOSUS, NOT INTRACTABLE, UNSPECIFIED MIGRAINE TYPE: ICD-10-CM

## 2024-07-31 PROCEDURE — 99213 OFFICE O/P EST LOW 20 MIN: CPT | Performed by: NURSE PRACTITIONER

## 2024-07-31 ASSESSMENT — ENCOUNTER SYMPTOMS
NAUSEA: 1
PHOTOPHOBIA: 1

## 2024-07-31 NOTE — PROGRESS NOTES
Kathy Oconnor (:  1990) is a Established patient, here for evaluation of the following:    Headache (Vision changes/)       Assessment & Plan:  Below is the assessment and plan developed based on review of pertinent history, physical exam, labs, studies, and medications.  1. Blurred vision  Referred to see her optometrist today  2. Migraine without status migrainosus, not intractable, unspecified migraine type  Patient treating with OTC ibuprofen, follow up with PCP if not resolved  No follow-ups on file.  The patient would benefit from future follow up with their usual PCP. As of the end of their Virtualist Visit today, follow up visit status is as follows: Patient to follow up as previously instructed by PCP    Subjective:   Torrie is a 34 year old patient of Debra Montalvo. She requested this video on demand due to vision changes in her left eye arounf 9 am. She says it felt like she was looking through a kaliedoscope and there was something in her eye. She rinsed eye and administered eye drops. The vision change has resolved. She does feel some sensitivity to light though. She denies discharge, itching, and pain in eye. She also says she feels a migraine coming on and she manages that with ibuprofen. She is slightly nauseated and reports an episode of dizziness. She has eustachian tube dysfunction, as well.   Advised patient that virtually we could not examine eyes or ears. Advised she see her optometrist today, and follow up with PCP for persistence of other symptoms. She said she had something for nausea and needed no prescriptions at this time. She requested all medications removed from chart, as she is not taking them currently. Patient agrees to this plan.      Review of Systems   Eyes:  Positive for photophobia and visual disturbance.   Gastrointestinal:  Positive for nausea.   Neurological:  Positive for headaches.       Objective:  Patient-Reported Vitals  No data recorded

## 2025-03-04 ENCOUNTER — PATIENT MESSAGE (OUTPATIENT)
Dept: FAMILY MEDICINE CLINIC | Age: 35
End: 2025-03-04

## 2025-03-04 NOTE — TELEPHONE ENCOUNTER
Spoke to pt.  Advised to ER since symptoms are not getting better since taking meds at 12pm.  has not had a bm today.